# Patient Record
Sex: FEMALE | Race: WHITE
[De-identification: names, ages, dates, MRNs, and addresses within clinical notes are randomized per-mention and may not be internally consistent; named-entity substitution may affect disease eponyms.]

---

## 2023-03-23 ENCOUNTER — HOSPITAL ENCOUNTER (OUTPATIENT)
Dept: HOSPITAL 47 - WWCWWP | Age: 53
End: 2023-03-23
Attending: SURGERY
Payer: COMMERCIAL

## 2023-03-23 VITALS
TEMPERATURE: 99.3 F | SYSTOLIC BLOOD PRESSURE: 157 MMHG | DIASTOLIC BLOOD PRESSURE: 78 MMHG | HEART RATE: 118 BPM | RESPIRATION RATE: 18 BRPM

## 2023-03-23 DIAGNOSIS — Z98.82: Primary | ICD-10-CM

## 2023-03-23 DIAGNOSIS — N63.20: ICD-10-CM

## 2023-03-23 DIAGNOSIS — Z87.891: ICD-10-CM

## 2023-03-23 DIAGNOSIS — J44.9: ICD-10-CM

## 2023-03-23 DIAGNOSIS — N63.10: ICD-10-CM

## 2023-03-23 DIAGNOSIS — Z79.51: ICD-10-CM

## 2023-03-23 NOTE — P.GSHP
History of Present Illness


H&P Date: 03/23/23


Chief Complaint: bilateral ruptured implants





     Audrey is a 53 year old white female who had bilateral breast implants 

placed in 2023 in [place].  Since that time the implants had become very hard

and painful for her.  [place] [address] which revealed 

bilateral intracapsular and extracapsular rupture.  Non-simple fluid was seen 

bilaterally in the extracapsular portion of both breasts.  There were well-

defined bilateral masses external to the fibrous capsule without abnormal 

enhancement which could reflect additional areas of wall denies simple fluid 

collection or cyst.  Findings were consistent with bilateral extracapsular 

rupture.  No suspicious enhancing masses were seen to suggest malignancy. She 

has not had any other surgery on her breast. The pain has increased over the 

last 6 months. They are very hard. She aslo compalins of nipple pain. 





caffiene: none


nicotine: none


chocolate: occasional


BCP: 15 years





Family History:


does not know family history





Hormonal History:


menarche: 12


G0


menopause: 49 still having hot flashes





Surgical history:


Bilateral breast implants








Medical History:


COPD


asthma








Social History:


nicotine: none


alcohol: beer weekend 8-12 beers


drugs: none now used to smoke it in the past 15 years ago

















 





- Constitutional


Constitutional: Denies chills, Denies fever





- EENT


Eyes: bilateral blurred vision, bilateral pain


Ears: left: decreased hearing


Ears, nose, mouth and throat: Reports headache





- Breasts


Breasts: bilateral: as per HPI





- Cardiovascular


Cardiovascular: Reports chest pain, Denies shortness of breath





- Respiratory


Respiratory: Reports cough





- Gastrointestinal


Gastrointestinal: Denies abdominal pain, Denies diarrhea, Denies nausea, Denies 

vomiting





- Genitourinary (Female)


Genitourinary: Denies dysuria, Denies hematuria





- Menstruation


Menstruation: Reports postmenopausal





- Musculoskeletal


Musculoskeletal: Reports myalgias





- Integumentary


Integumentary: Reports pruritus, Reports rash





- Neurological


Neurological: Reports numbness, Reports weakness





- Psychiatric


Psychiatric: Reports anxiety, Reports depression





- Endocrine


Endocrine: Reports fatigue, Reports weight change





- Hematologic/Lymphatic


Comment: 





none





- Allergic/Immunologic


Comment: 





Breast Exam:


Bra: does not wear secondary to pain


Inspection: Very firm breast


Palpation:


Right breast:  very tender to palpation nodularity in the parenchyma capsular 

contraction of the right implant cannot rule out rupture versus mass in the 

breast parenchyma


Right axilla: No adenopathy of concern


Left breast: Very tender to palpation nodularity in the parenchyma of the breast

cannot rule out a mass versus capsular contraction and leaking


Left axilla: No adenopathy of concern


Allergic/Immunologic: Reports seasonal allergies





Medications and Allergies


                                Home Medications











 Medication  Instructions  Recorded  Confirmed  Type


 


Albuterol Inhaler [Ventolin Hfa 1 puff PO AS DIRECTED PRN 03/23/23 03/23/23 

History





Inhaler]    


 


Albuterol Nebulized [Ventolin 1.25 mg PO AS DIRECTED PRN 03/23/23 03/23/23 

History





Nebulized (Accuneb)]    


 


Budesonide/Formoterol Fumarate 1 puff PO BID 03/23/23 03/23/23 History





[Symbicort 160-4.5 Mcg Inhaler]    


 


Cyclobenzaprine [Flexeril] 5 mg PO AS DIRECTED PRN 03/23/23 03/23/23 History


 


Loratadine [Claritin] 10 mg PO DAILY 03/23/23 03/23/23 History


 


QUEtiapine [SEROquel] 25 mg PO BID 03/23/23 03/23/23 History


 


diazePAM [Valium] 5 mg PO BID 03/23/23 03/23/23 History


 


diphenhydrAMINE HCL [Benadryl] 25 mg PO BID 03/23/23 03/23/23 History








                                    Allergies











Allergy/AdvReac Type Severity Reaction Status Date / Time


 


No Known Allergies Allergy   Unverified 03/23/23 14:04














Surgical - Exam





- General


moderate distress





- Eyes


normal ocular movement





- Neck


trachea midline





- Respiratory


normal respiratory effort, clear to auscultation





- Cardiovascular


Rhythm: regular


Heart Sounds: normal: S1, S2





- Abdomen


Abdomen: soft, non tender, no guarding, no rigid, no rebound





- Integumentary





normal turgor





- Neurologic


no disoriented, no combative





- Musculoskeletal


normal gait





- Psychiatric


oriented to time, oriented to person, oriented to place, speech is normal, 

memory intact





Breast Exam:


BRA: does not wear one secondary to pain





Results





Bilateral breast MRI reviewed





Assessment and Plan


Assessment: 





Impression:


Bilateral ruptured implants which are very symptomatic and painful for the 

patient


Patient additionally has masses in the parenchyma of both breasts cannot rule 

out ruptured implant debris versus masses in the parenchyma





Plan:


Bilateral breast masses removal these appear to be adherent to implants and it 

would be necessary to remove the implants and capsules at the same time


Cannot rule out masses within the breast versus ruptured implant without removal





CC: Dr. Alvarez

## 2023-06-01 ENCOUNTER — HOSPITAL ENCOUNTER (OUTPATIENT)
Dept: HOSPITAL 47 - WWCWWP | Age: 53
End: 2023-06-01
Attending: SURGERY
Payer: COMMERCIAL

## 2023-06-01 VITALS
RESPIRATION RATE: 17 BRPM | DIASTOLIC BLOOD PRESSURE: 90 MMHG | SYSTOLIC BLOOD PRESSURE: 174 MMHG | TEMPERATURE: 97.9 F | HEART RATE: 97 BPM

## 2023-06-01 DIAGNOSIS — J44.9: ICD-10-CM

## 2023-06-01 DIAGNOSIS — Z87.891: ICD-10-CM

## 2023-06-01 DIAGNOSIS — Z79.51: ICD-10-CM

## 2023-06-01 DIAGNOSIS — Z85.3: Primary | ICD-10-CM

## 2023-06-01 NOTE — P.PN
Subjective


Progress Note Date: 06/01/23


Principal diagnosis: 





bilateral ruptured breast implants





History of Present Illness


H&P Date: 6-1-23


Chief Complaint: bilateral ruptured implants





     Audrey is a 53 year old white female who had bilateral breast implants 

placed in 2000 in [place].  Since that time the implants had become very hard

and painful for her.  She had a bilateral MRI done on 12723 which revealed 

bilateral intracapsular and extracapsular rupture.  Non-simple fluid was seen 

bilaterally in the extracapsular portion of both breasts.  There were well-

defined bilateral masses external to the fibrous capsule without abnormal 

enhancement which could reflect additional areas of wall verses simple fluid 

collection or cyst.  Findings were consistent with bilateral extracapsular 

rupture.  No suspicious enhancing masses were seen to suggest malignancy. She 

has not had any other surgery on her breast. The pain has increased over the 

last 9 months. They are very hard. She also complains of nipple pain. 





Patient got hit in the right side of her face while doing yard work. 





caffiene: none


nicotine: none


chocolate: occasional


BCP: 15 years





Family History:


does not know family history





Hormonal History:


menarche: 12


G0


menopause: 49 still having hot flashes





Surgical history:


Bilateral breast implants








Medical History:


COPD


asthma








Social History:


nicotine: none


alcohol: beer weekend 8-12 beers


drugs: none now used to smoke it in the past 15 years ago





- Constitutional


Constitutional: Denies chills, Denies fever





- EENT


Eyes: bilateral blurred vision, bilateral pain


Ears: left: decreased hearing


Ears, nose, mouth and throat: Reports headache





- Breasts


Breasts: bilateral: as per HPI





- Cardiovascular


Cardiovascular: Reports chest pain, Denies shortness of breath





- Respiratory


Respiratory: Reports cough





- Gastrointestinal


Gastrointestinal: Denies abdominal pain, Denies diarrhea, Denies nausea, Denies 

vomiting





- Genitourinary (Female)


Genitourinary: Denies dysuria, Denies hematuria





- Menstruation


Menstruation: Reports postmenopausal





- Musculoskeletal


Musculoskeletal: Reports myalgias





- Integumentary


Integumentary: Reports pruritus, Reports rash





- Neurological


Neurological: Reports numbness, Reports weakness





- Psychiatric


Psychiatric: Reports anxiety, Reports depression





- Endocrine


Endocrine: Reports fatigue, Reports weight change





- Hematologic/Lymphatic


Comment: 





none





- Allergic/Immunologic


Comment: 








Medications and Allergies


                                Home Medications











 Medication  Instructions  Recorded  Confirmed  Type


 


Albuterol Inhaler [Ventolin Hfa 1 puff PO AS DIRECTED PRN 03/23/23 03/23/23 

History





Inhaler]    


 


Albuterol Nebulized [Ventolin 1.25 mg PO AS DIRECTED PRN 03/23/23 03/23/23 

History





Nebulized (Accuneb)]    


 


Budesonide/Formoterol Fumarate 1 puff PO BID 03/23/23 03/23/23 History





[Symbicort 160-4.5 Mcg Inhaler]    


 


Cyclobenzaprine [Flexeril] 5 mg PO AS DIRECTED PRN 03/23/23 03/23/23 History


 


Loratadine [Claritin] 10 mg PO DAILY 03/23/23 03/23/23 History


 


QUEtiapine [SEROquel] 25 mg PO BID 03/23/23 03/23/23 History


 


diazePAM [Valium] 5 mg PO BID 03/23/23 03/23/23 History


 


diphenhydrAMINE HCL [Benadryl] 25 mg PO BID 03/23/23 03/23/23 History








                                    Allergies











Allergy/AdvReac Type Severity Reaction Status Date / Time


 


No Known Allergies Allergy   Unverified 03/23/23 14:04




















Objective





- Constitutional


General appearance: Present: cooperative





- EENT


Eyes: Present: EOMI


ENT: Present: hearing grossly normal





- Neck


Neck: Present: normal ROM





- Respiratory


Respiratory: bilateral: CTA





- Cardiovascular


Rhythm: regular


Heart sounds: normal: S1, S2





- Gastrointestinal


General gastrointestinal: Present: soft





- Integumentary


Integumentary Comment(s): 





echymosis right side of the face


Integumentary: Present: normal turgor





- Musculoskeletal


Musculoskeletal: Present: gait normal





- Psychiatric


Psychiatric: Present: A&O x's 3, appropriate affect, intact judgment & insight





- Additional findings


Additional findings: 


Breast Exam:


Bra: does not wear secondary to pain


Inspection: Very firm breast


Palpation:


Right breast:  very tender to palpation nodularity in the parenchyma capsular 

contraction of the right implant cannot rule out rupture versus mass in the 

breast parenchyma


Right axilla: No adenopathy of concern


Left breast: Very tender to palpation nodularity in the parenchyma of the breast

 cannot rule out a mass versus capsular contraction and leaking


Left axilla: No adenopathy of concern








Assessment and Plan


Assessment: 


Impression:


Bilateral ruptured implants which are very symptomatic and painful for the 

patient


Patient additionally has masses in the parenchyma of both breasts cannot rule 

out ruptured implant debris versus masses in the parenchyma, radiology is not 

comfortable to biopsy these with the implants in place





Plan:


Bilateral breast masses removal these appear to be adherent to implants and it 

would be necessary to remove the implants and capsules at the same time


Cannot rule out masses within the breast versus ruptured implant without removal





cc: Dr.Ye Danitza

## 2023-06-13 ENCOUNTER — HOSPITAL ENCOUNTER (OUTPATIENT)
Dept: HOSPITAL 47 - OR | Age: 53
Discharge: HOME | End: 2023-06-13
Attending: SURGERY
Payer: COMMERCIAL

## 2023-06-13 VITALS — TEMPERATURE: 97.6 F

## 2023-06-13 VITALS — DIASTOLIC BLOOD PRESSURE: 88 MMHG | HEART RATE: 115 BPM | SYSTOLIC BLOOD PRESSURE: 143 MMHG

## 2023-06-13 VITALS — BODY MASS INDEX: 25.6 KG/M2

## 2023-06-13 VITALS — RESPIRATION RATE: 16 BRPM

## 2023-06-13 DIAGNOSIS — T85.49XA: Primary | ICD-10-CM

## 2023-06-13 DIAGNOSIS — J44.9: ICD-10-CM

## 2023-06-13 DIAGNOSIS — Z79.899: ICD-10-CM

## 2023-06-13 DIAGNOSIS — F41.9: ICD-10-CM

## 2023-06-13 PROCEDURE — 87070 CULTURE OTHR SPECIMN AEROBIC: CPT

## 2023-06-13 PROCEDURE — 19328 RMVL INTACT BREAST IMPLANT: CPT

## 2023-06-13 PROCEDURE — 87075 CULTR BACTERIA EXCEPT BLOOD: CPT

## 2023-06-13 PROCEDURE — 87205 SMEAR GRAM STAIN: CPT

## 2023-06-13 RX ADMIN — HYDROMORPHONE HYDROCHLORIDE PRN MG: 1 INJECTION, SOLUTION INTRAMUSCULAR; INTRAVENOUS; SUBCUTANEOUS at 11:21

## 2023-06-13 RX ADMIN — HYDROMORPHONE HYDROCHLORIDE PRN MG: 1 INJECTION, SOLUTION INTRAMUSCULAR; INTRAVENOUS; SUBCUTANEOUS at 11:32

## 2023-06-13 RX ADMIN — HYDROMORPHONE HYDROCHLORIDE PRN MG: 1 INJECTION, SOLUTION INTRAMUSCULAR; INTRAVENOUS; SUBCUTANEOUS at 11:49

## 2023-06-13 NOTE — P.DS
Providers


Attending physician: 


Jennifer Wilkes





Primary care physician: 


Rudi Alvarez MD








Plan - Discharge Summary


Discharge Rx Participant: Yes


New Discharge Prescriptions: 


New


   HYDROcodone/APAP 5-325MG [Norco 5] 1 - 2 each PO Q4H PRN #20 tab


     PRN Reason: Pain





No Action


   Multivitamins, Thera [Multivitamin (formulary)] 1 tab PO DAILY


   Vitamin C (Unknown Dose) 1 tab PO DAILY


   diazePAM [Valium] 5 mg PO BID


   diphenhydrAMINE HCL [Benadryl] 25 mg PO BID


   Budesonide/Formoterol Fumarate [Symbicort 160-4.5 Mcg Inhaler] 1 puff PO BID


   Albuterol Inhaler [Ventolin Hfa Inhaler] 1 puff PO AS DIRECTED PRN


     PRN Reason: Shortness Of Breath


   Albuterol Nebulized [Ventolin Nebulized (Accuneb)] 1.25 mg PO AS DIRECTED PRN


     PRN Reason: Shortness Of Breath


   Loratadine [Claritin] 10 mg PO DAILY


   Cyclobenzaprine [Flexeril] 10 mg PO TID


Discharge Medication List





Albuterol Inhaler [Ventolin Hfa Inhaler] 1 puff PO AS DIRECTED PRN 03/23/23 

[History]


Albuterol Nebulized [Ventolin Nebulized (Accuneb)] 1.25 mg PO AS DIRECTED PRN 

03/23/23 [History]


Budesonide/Formoterol Fumarate [Symbicort 160-4.5 Mcg Inhaler] 1 puff PO BID 

03/23/23 [History]


Loratadine [Claritin] 10 mg PO DAILY 03/23/23 [History]


diphenhydrAMINE HCL [Benadryl] 25 mg PO BID 03/23/23 [History]


Multivitamins, Thera [Multivitamin (formulary)] 1 tab PO DAILY 06/09/23 

[History]


Vitamin C (Unknown Dose) 1 tab PO DAILY 06/09/23 [History]


Cyclobenzaprine [Flexeril] 10 mg PO TID 06/13/23 [History]


HYDROcodone/APAP 5-325MG [Norco 5] 1 - 2 each PO Q4H PRN #20 tab 06/13/23 [Rx]


diazePAM [Valium] 5 mg PO BID 06/13/23 [History]








Follow up Appointment(s)/Referral(s): 


Jennifer Wlikes MD [STAFF PHYSICIAN] - 1 Week


Activity/Diet/Wound Care/Special Instructions: 


do not drive until seen by Dr. Son


may shower after 48 hours


teach patient drain care, drain and record BID


Discharge Disposition: HOME SELF-CARE

## 2023-06-13 NOTE — P.OP
Date of Procedure: 06/13/23


Preoperative Diagnosis: 


Bilateral breast implant contracture with possible rupture of the implants


Postoperative Diagnosis: 


bilateral implant capsular contracture


Anesthesia: SINGH


Surgeon: Jennifer Wilkes


Estimated Blood Loss (ml): 10


IV fluids (ml): 1,500


Pathology: other (Bilateral breast implants, bilateral capsulectomies)


Condition: stable


Disposition: same day


Indications for Procedure: 


Bilateral capsular contracture with possible implant rupture and nodularity in 

each breast


Operative Findings: 


Bilateral Capsular contracture with hematoma between the implant in the right 

breast capsule


Description of Procedure: 


The patient was taken to the operating room and following induction of 

anesthesia both breasts were prepped and draped in a sterile fashion.  The right

breast was approached initially.  An incision was made and carried down to the 

capsule of the implant.  Dissection was performed around the capsule and the 

capsule was entered approximately 200 mL of blood-tinged fluid was noted to be 

between the capsule and the implant.  This was irrigated and the implant was 

removed.  Continued dissection was performed to remove the capsule.  There were 

extrusions from the capsule which were removed in continuity with the capsule.


The wound was well rrigated.  Surgicel in powder from was placed.  After we were

assured that hemostasis was attained a #10 MARY drain was placed and secured using

a nylon suture.  The deep tissues were closed using 3-0 Vicryl suture.  This was

followed by closure of the subcuticular tissue with a 4-0 Monocryl and 4-0 nylon

on the skin.  


     The left breast was approached in the same fashion.


An incision was made and carried down to the capsule of the implant.  Dissection

was performed around the capsule until the capsule was entered.  Upon entering 

the capsule the implant was removed.  Continued dissection was performed 

removing the capsule and any outcropping's from the capsule which were also 

contracted.  After this tissue was removed the wound was well irrigated.  

Surgicel was placed.  After we were assured that hemostasis was attained a MARY 

drain was placed.    The MARY drain was secured using 4-0 nylon suture.  The deep 

tissues were closed using 3-0 Vicryl suture.  The subcuticular tissue was closed

using 4-0 Monocryl.  The skin was closed using a 4-0 nylon suture.  





Both implants were prepectoral in location.


The patient tolerated the procedure in stable condition.  All instrument and 

sponge counts were correct at the end of the case.

## 2023-06-22 ENCOUNTER — HOSPITAL ENCOUNTER (OUTPATIENT)
Dept: HOSPITAL 47 - WWCWWP | Age: 53
End: 2023-06-22
Attending: SURGERY
Payer: COMMERCIAL

## 2023-06-22 VITALS
SYSTOLIC BLOOD PRESSURE: 115 MMHG | RESPIRATION RATE: 16 BRPM | DIASTOLIC BLOOD PRESSURE: 82 MMHG | TEMPERATURE: 98.1 F | HEART RATE: 80 BPM

## 2023-06-22 DIAGNOSIS — Z98.86: Primary | ICD-10-CM

## 2023-06-22 NOTE — P.PN
Progress Note - Text


Progress Note Date: 06/22/23





     Audrey is status post bilateral implant removal on 6-13-23. She is doing 

well post op.  She has bilateral MARY drains both are less than 30 mL for 2 days 

and overall and and serous in nature.  Pathology benign.





Physical examination:





Lungs: Clear


Heart: Regular rate and rhythm


Incisions: Clean and dry bilateral


MARY drain serous bilateral





Impression:


Patient doing well postop





Plan:


Remove MARY drains


Remove sutures


Patient to follow up here in 6 months with bilateral mammogram





CC: [facility]